# Patient Record
Sex: FEMALE | URBAN - METROPOLITAN AREA
[De-identification: names, ages, dates, MRNs, and addresses within clinical notes are randomized per-mention and may not be internally consistent; named-entity substitution may affect disease eponyms.]

---

## 2018-03-20 ENCOUNTER — HOSPITAL ENCOUNTER (OUTPATIENT)
Age: 73
Discharge: HOME | End: 2018-03-22
Payer: COMMERCIAL

## 2018-03-20 LAB
ALBUMIN SERPL-MCNC: 4.5 G/DL (ref 3.5–5.2)
ALP BLD-CCNC: 61 U/L (ref 35–104)
ALT SERPL-CCNC: 13 U/L (ref 0–32)
ANION GAP SERPL CALCULATED.3IONS-SCNC: 18 MMOL/L (ref 7–16)
AST SERPL-CCNC: 19 U/L (ref 0–31)
BASOPHILS ABSOLUTE: 0.09 E9/L (ref 0–0.2)
BASOPHILS RELATIVE PERCENT: 1.1 % (ref 0–2)
BILIRUB SERPL-MCNC: 0.5 MG/DL (ref 0–1.2)
BUN BLDV-MCNC: 21 MG/DL (ref 8–23)
CALCIUM SERPL-MCNC: 9.7 MG/DL (ref 8.6–10.2)
CHLORIDE BLD-SCNC: 103 MMOL/L (ref 98–107)
CHOLESTEROL, TOTAL: 193 MG/DL (ref 0–199)
CO2: 22 MMOL/L (ref 22–29)
CREAT SERPL-MCNC: 1.1 MG/DL (ref 0.5–1)
EOSINOPHILS ABSOLUTE: 0.18 E9/L (ref 0.05–0.5)
EOSINOPHILS RELATIVE PERCENT: 2.2 % (ref 0–6)
GFR AFRICAN AMERICAN: 59
GFR NON-AFRICAN AMERICAN: 49 ML/MIN/1.73
GLUCOSE BLD-MCNC: 109 MG/DL (ref 74–109)
HCT VFR BLD CALC: 46.1 % (ref 34–48)
HDLC SERPL-MCNC: 61 MG/DL
HEMOGLOBIN: 14.5 G/DL (ref 11.5–15.5)
IMMATURE GRANULOCYTES #: 0.04 E9/L
IMMATURE GRANULOCYTES %: 0.5 % (ref 0–5)
LDL CHOLESTEROL CALCULATED: 116 MG/DL (ref 0–99)
LYMPHOCYTES ABSOLUTE: 2.49 E9/L (ref 1.5–4)
LYMPHOCYTES RELATIVE PERCENT: 30.6 % (ref 20–42)
MCH RBC QN AUTO: 28.9 PG (ref 26–35)
MCHC RBC AUTO-ENTMCNC: 31.5 % (ref 32–34.5)
MCV RBC AUTO: 92 FL (ref 80–99.9)
MONOCYTES ABSOLUTE: 0.59 E9/L (ref 0.1–0.95)
MONOCYTES RELATIVE PERCENT: 7.2 % (ref 2–12)
NEUTROPHILS ABSOLUTE: 4.75 E9/L (ref 1.8–7.3)
NEUTROPHILS RELATIVE PERCENT: 58.4 % (ref 43–80)
PDW BLD-RTO: 13.7 FL (ref 11.5–15)
PLATELET # BLD: 237 E9/L (ref 130–450)
PMV BLD AUTO: 10.8 FL (ref 7–12)
POTASSIUM SERPL-SCNC: 4.3 MMOL/L (ref 3.5–5)
RBC # BLD: 5.01 E12/L (ref 3.5–5.5)
SODIUM BLD-SCNC: 143 MMOL/L (ref 132–146)
TOTAL PROTEIN: 7.6 G/DL (ref 6.4–8.3)
TRIGL SERPL-MCNC: 81 MG/DL (ref 0–149)
VLDLC SERPL CALC-MCNC: 16 MG/DL
WBC # BLD: 8.1 E9/L (ref 4.5–11.5)

## 2018-03-20 PROCEDURE — 80053 COMPREHEN METABOLIC PANEL: CPT

## 2018-03-20 PROCEDURE — 85025 COMPLETE CBC W/AUTO DIFF WBC: CPT

## 2018-03-20 PROCEDURE — 80061 LIPID PANEL: CPT

## 2018-07-31 ENCOUNTER — HOSPITAL ENCOUNTER (OUTPATIENT)
Age: 73
Discharge: HOME | End: 2018-08-02

## 2018-07-31 LAB
ABO/RH: NORMAL
ANION GAP SERPL CALCULATED.3IONS-SCNC: 15 MMOL/L (ref 7–16)
ANTIBODY SCREEN: NORMAL
BILIRUBIN URINE: NEGATIVE
BLOOD, URINE: NEGATIVE
BUN BLDV-MCNC: 29 MG/DL (ref 8–23)
CALCIUM SERPL-MCNC: 9.2 MG/DL (ref 8.6–10.2)
CHLORIDE BLD-SCNC: 101 MMOL/L (ref 98–107)
CLARITY: CLEAR
CO2: 26 MMOL/L (ref 22–29)
COLOR: YELLOW
CREAT SERPL-MCNC: 1.1 MG/DL (ref 0.5–1)
GFR AFRICAN AMERICAN: 59
GFR NON-AFRICAN AMERICAN: 49 ML/MIN/1.73
GLUCOSE BLD-MCNC: 71 MG/DL (ref 74–109)
GLUCOSE URINE: NEGATIVE MG/DL
HCT VFR BLD CALC: 43.9 % (ref 34–48)
HEMOGLOBIN: 14.5 G/DL (ref 11.5–15.5)
KETONES, URINE: NEGATIVE MG/DL
LEUKOCYTE ESTERASE, URINE: NEGATIVE
MCH RBC QN AUTO: 30 PG (ref 26–35)
MCHC RBC AUTO-ENTMCNC: 33 % (ref 32–34.5)
MCV RBC AUTO: 90.7 FL (ref 80–99.9)
NITRITE, URINE: NEGATIVE
PDW BLD-RTO: 13.6 FL (ref 11.5–15)
PH UA: 6 (ref 5–9)
PLATELET # BLD: 216 E9/L (ref 130–450)
PMV BLD AUTO: 10.9 FL (ref 7–12)
POTASSIUM SERPL-SCNC: 4.2 MMOL/L (ref 3.5–5)
PROTEIN UA: NEGATIVE MG/DL
RBC # BLD: 4.84 E12/L (ref 3.5–5.5)
SODIUM BLD-SCNC: 142 MMOL/L (ref 132–146)
SPECIFIC GRAVITY UA: 1.02 (ref 1–1.03)
UROBILINOGEN, URINE: 0.2 E.U./DL
WBC # BLD: 11.3 E9/L (ref 4.5–11.5)

## 2018-07-31 PROCEDURE — 87088 URINE BACTERIA CULTURE: CPT

## 2018-07-31 PROCEDURE — 85027 COMPLETE CBC AUTOMATED: CPT

## 2018-07-31 PROCEDURE — 86901 BLOOD TYPING SEROLOGIC RH(D): CPT

## 2018-07-31 PROCEDURE — 81003 URINALYSIS AUTO W/O SCOPE: CPT

## 2018-07-31 PROCEDURE — 87081 CULTURE SCREEN ONLY: CPT

## 2018-07-31 PROCEDURE — 86850 RBC ANTIBODY SCREEN: CPT

## 2018-07-31 PROCEDURE — 86900 BLOOD TYPING SEROLOGIC ABO: CPT

## 2018-07-31 PROCEDURE — 80048 BASIC METABOLIC PNL TOTAL CA: CPT

## 2018-08-02 LAB
MRSA CULTURE ONLY: NORMAL
URINE CULTURE, ROUTINE: NORMAL

## 2018-08-10 ENCOUNTER — HOSPITAL ENCOUNTER (OUTPATIENT)
Age: 73
Discharge: HOME | End: 2018-08-12

## 2018-08-10 LAB
ANION GAP SERPL CALCULATED.3IONS-SCNC: 10 MMOL/L (ref 7–16)
BUN BLDV-MCNC: 16 MG/DL (ref 8–23)
CALCIUM SERPL-MCNC: 8 MG/DL (ref 8.6–10.2)
CHLORIDE BLD-SCNC: 106 MMOL/L (ref 98–107)
CO2: 25 MMOL/L (ref 22–29)
CREAT SERPL-MCNC: 0.9 MG/DL (ref 0.5–1)
GFR AFRICAN AMERICAN: >60
GFR NON-AFRICAN AMERICAN: >60 ML/MIN/1.73
GLUCOSE BLD-MCNC: 141 MG/DL (ref 74–109)
HCT VFR BLD CALC: 33 % (ref 34–48)
HEMOGLOBIN: 10.8 G/DL (ref 11.5–15.5)
MCH RBC QN AUTO: 30.3 PG (ref 26–35)
MCHC RBC AUTO-ENTMCNC: 32.7 % (ref 32–34.5)
MCV RBC AUTO: 92.4 FL (ref 80–99.9)
PDW BLD-RTO: 13.2 FL (ref 11.5–15)
PLATELET # BLD: 166 E9/L (ref 130–450)
PMV BLD AUTO: 10.8 FL (ref 7–12)
POTASSIUM SERPL-SCNC: 4.1 MMOL/L (ref 3.5–5)
RBC # BLD: 3.57 E12/L (ref 3.5–5.5)
SODIUM BLD-SCNC: 141 MMOL/L (ref 132–146)
WBC # BLD: 11.5 E9/L (ref 4.5–11.5)

## 2018-08-10 PROCEDURE — 85027 COMPLETE CBC AUTOMATED: CPT

## 2018-08-10 PROCEDURE — 80048 BASIC METABOLIC PNL TOTAL CA: CPT

## 2018-08-11 ENCOUNTER — HOSPITAL ENCOUNTER (OUTPATIENT)
Age: 73
Discharge: HOME | End: 2018-08-13

## 2018-08-11 LAB
ANION GAP SERPL CALCULATED.3IONS-SCNC: 11 MMOL/L (ref 7–16)
BUN BLDV-MCNC: 21 MG/DL (ref 8–23)
CALCIUM SERPL-MCNC: 8.9 MG/DL (ref 8.6–10.2)
CHLORIDE BLD-SCNC: 103 MMOL/L (ref 98–107)
CO2: 24 MMOL/L (ref 22–29)
CREAT SERPL-MCNC: 1 MG/DL (ref 0.5–1)
GFR AFRICAN AMERICAN: >60
GFR NON-AFRICAN AMERICAN: 54 ML/MIN/1.73
GLUCOSE BLD-MCNC: 118 MG/DL (ref 74–109)
HCT VFR BLD CALC: 34.2 % (ref 34–48)
HEMOGLOBIN: 11.3 G/DL (ref 11.5–15.5)
MCH RBC QN AUTO: 29.7 PG (ref 26–35)
MCHC RBC AUTO-ENTMCNC: 33 % (ref 32–34.5)
MCV RBC AUTO: 90 FL (ref 80–99.9)
PDW BLD-RTO: 13.8 FL (ref 11.5–15)
PLATELET # BLD: 184 E9/L (ref 130–450)
PMV BLD AUTO: 11.3 FL (ref 7–12)
POTASSIUM SERPL-SCNC: 3.9 MMOL/L (ref 3.5–5)
RBC # BLD: 3.8 E12/L (ref 3.5–5.5)
SODIUM BLD-SCNC: 138 MMOL/L (ref 132–146)
WBC # BLD: 12.2 E9/L (ref 4.5–11.5)

## 2018-08-11 PROCEDURE — 80048 BASIC METABOLIC PNL TOTAL CA: CPT

## 2018-08-11 PROCEDURE — 85027 COMPLETE CBC AUTOMATED: CPT

## 2019-02-13 ENCOUNTER — HOSPITAL ENCOUNTER (OUTPATIENT)
Age: 74
Discharge: HOME | End: 2019-02-15
Payer: COMMERCIAL

## 2019-02-13 LAB
ALBUMIN SERPL-MCNC: 4.8 G/DL (ref 3.5–5.2)
ALP BLD-CCNC: 63 U/L (ref 35–104)
ALT SERPL-CCNC: 17 U/L (ref 0–32)
ANION GAP SERPL CALCULATED.3IONS-SCNC: 19 MMOL/L (ref 7–16)
AST SERPL-CCNC: 24 U/L (ref 0–31)
BASOPHILS ABSOLUTE: 0.12 E9/L (ref 0–0.2)
BASOPHILS RELATIVE PERCENT: 1.5 % (ref 0–2)
BILIRUB SERPL-MCNC: 0.6 MG/DL (ref 0–1.2)
BUN BLDV-MCNC: 25 MG/DL (ref 8–23)
CALCIUM SERPL-MCNC: 10.1 MG/DL (ref 8.6–10.2)
CHLORIDE BLD-SCNC: 103 MMOL/L (ref 98–107)
CHOLESTEROL, TOTAL: 195 MG/DL (ref 0–199)
CO2: 23 MMOL/L (ref 22–29)
CREAT SERPL-MCNC: 1.2 MG/DL (ref 0.5–1)
EOSINOPHILS ABSOLUTE: 0.16 E9/L (ref 0.05–0.5)
EOSINOPHILS RELATIVE PERCENT: 2 % (ref 0–6)
GFR AFRICAN AMERICAN: 53
GFR NON-AFRICAN AMERICAN: 44 ML/MIN/1.73
GLUCOSE BLD-MCNC: 88 MG/DL (ref 74–99)
HCT VFR BLD CALC: 46.1 % (ref 34–48)
HDLC SERPL-MCNC: 59 MG/DL
HEMOGLOBIN: 15.5 G/DL (ref 11.5–15.5)
IMMATURE GRANULOCYTES #: 0.04 E9/L
IMMATURE GRANULOCYTES %: 0.5 % (ref 0–5)
LDL CHOLESTEROL CALCULATED: 111 MG/DL (ref 0–99)
LYMPHOCYTES ABSOLUTE: 2.63 E9/L (ref 1.5–4)
LYMPHOCYTES RELATIVE PERCENT: 33.1 % (ref 20–42)
MCH RBC QN AUTO: 29.9 PG (ref 26–35)
MCHC RBC AUTO-ENTMCNC: 33.6 % (ref 32–34.5)
MCV RBC AUTO: 89 FL (ref 80–99.9)
MONOCYTES ABSOLUTE: 0.72 E9/L (ref 0.1–0.95)
MONOCYTES RELATIVE PERCENT: 9.1 % (ref 2–12)
NEUTROPHILS ABSOLUTE: 4.28 E9/L (ref 1.8–7.3)
NEUTROPHILS RELATIVE PERCENT: 53.8 % (ref 43–80)
PDW BLD-RTO: 13 FL (ref 11.5–15)
PLATELET # BLD: 252 E9/L (ref 130–450)
PMV BLD AUTO: 10.5 FL (ref 7–12)
POTASSIUM SERPL-SCNC: 4.1 MMOL/L (ref 3.5–5)
RBC # BLD: 5.18 E12/L (ref 3.5–5.5)
SODIUM BLD-SCNC: 145 MMOL/L (ref 132–146)
TOTAL PROTEIN: 7.8 G/DL (ref 6.4–8.3)
TRIGL SERPL-MCNC: 123 MG/DL (ref 0–149)
TSH SERPL DL<=0.05 MIU/L-ACNC: 3.29 UIU/ML (ref 0.27–4.2)
VLDLC SERPL CALC-MCNC: 25 MG/DL
WBC # BLD: 8 E9/L (ref 4.5–11.5)

## 2019-02-13 PROCEDURE — 84443 ASSAY THYROID STIM HORMONE: CPT

## 2019-02-13 PROCEDURE — 80061 LIPID PANEL: CPT

## 2019-02-13 PROCEDURE — 80053 COMPREHEN METABOLIC PANEL: CPT

## 2019-02-13 PROCEDURE — 85025 COMPLETE CBC W/AUTO DIFF WBC: CPT

## 2019-09-04 ENCOUNTER — HOSPITAL ENCOUNTER (OUTPATIENT)
Age: 74
Discharge: HOME | End: 2019-09-06
Payer: COMMERCIAL

## 2019-09-04 LAB
ALBUMIN SERPL-MCNC: 4.5 G/DL (ref 3.5–5.2)
ALP BLD-CCNC: 59 U/L (ref 35–104)
ALT SERPL-CCNC: 9 U/L (ref 0–32)
ANION GAP SERPL CALCULATED.3IONS-SCNC: 22 MMOL/L (ref 7–16)
AST SERPL-CCNC: 17 U/L (ref 0–31)
BASOPHILS ABSOLUTE: 0.08 E9/L (ref 0–0.2)
BASOPHILS RELATIVE PERCENT: 1.1 % (ref 0–2)
BILIRUB SERPL-MCNC: 0.7 MG/DL (ref 0–1.2)
BUN BLDV-MCNC: 28 MG/DL (ref 8–23)
CALCIUM SERPL-MCNC: 9.6 MG/DL (ref 8.6–10.2)
CHLORIDE BLD-SCNC: 107 MMOL/L (ref 98–107)
CHOLESTEROL, TOTAL: 220 MG/DL (ref 0–199)
CO2: 20 MMOL/L (ref 22–29)
CREAT SERPL-MCNC: 1.4 MG/DL (ref 0.5–1)
EOSINOPHILS ABSOLUTE: 0.17 E9/L (ref 0.05–0.5)
EOSINOPHILS RELATIVE PERCENT: 2.3 % (ref 0–6)
GFR AFRICAN AMERICAN: 44
GFR NON-AFRICAN AMERICAN: 37 ML/MIN/1.73
GLUCOSE BLD-MCNC: 97 MG/DL (ref 74–99)
HCT VFR BLD CALC: 44.3 % (ref 34–48)
HDLC SERPL-MCNC: 54 MG/DL
HEMOGLOBIN: 14.2 G/DL (ref 11.5–15.5)
IMMATURE GRANULOCYTES #: 0.03 E9/L
IMMATURE GRANULOCYTES %: 0.4 % (ref 0–5)
LDL CHOLESTEROL CALCULATED: 138 MG/DL (ref 0–99)
LYMPHOCYTES ABSOLUTE: 2.63 E9/L (ref 1.5–4)
LYMPHOCYTES RELATIVE PERCENT: 35.5 % (ref 20–42)
MCH RBC QN AUTO: 29.5 PG (ref 26–35)
MCHC RBC AUTO-ENTMCNC: 32.1 % (ref 32–34.5)
MCV RBC AUTO: 91.9 FL (ref 80–99.9)
MONOCYTES ABSOLUTE: 0.61 E9/L (ref 0.1–0.95)
MONOCYTES RELATIVE PERCENT: 8.2 % (ref 2–12)
NEUTROPHILS ABSOLUTE: 3.88 E9/L (ref 1.8–7.3)
NEUTROPHILS RELATIVE PERCENT: 52.5 % (ref 43–80)
PDW BLD-RTO: 12.9 FL (ref 11.5–15)
PLATELET # BLD: 206 E9/L (ref 130–450)
PMV BLD AUTO: 11 FL (ref 7–12)
POTASSIUM SERPL-SCNC: 3.8 MMOL/L (ref 3.5–5)
RBC # BLD: 4.82 E12/L (ref 3.5–5.5)
SODIUM BLD-SCNC: 149 MMOL/L (ref 132–146)
TOTAL PROTEIN: 7.3 G/DL (ref 6.4–8.3)
TRIGL SERPL-MCNC: 141 MG/DL (ref 0–149)
TSH SERPL DL<=0.05 MIU/L-ACNC: 2.7 UIU/ML (ref 0.27–4.2)
VLDLC SERPL CALC-MCNC: 28 MG/DL
WBC # BLD: 7.4 E9/L (ref 4.5–11.5)

## 2019-09-04 PROCEDURE — 84443 ASSAY THYROID STIM HORMONE: CPT

## 2019-09-04 PROCEDURE — 80053 COMPREHEN METABOLIC PANEL: CPT

## 2019-09-04 PROCEDURE — 85025 COMPLETE CBC W/AUTO DIFF WBC: CPT

## 2019-09-04 PROCEDURE — 80061 LIPID PANEL: CPT

## 2019-11-07 ENCOUNTER — HOSPITAL ENCOUNTER (OUTPATIENT)
Age: 74
Discharge: HOME | End: 2019-11-09

## 2019-11-07 LAB
ABO/RH: NORMAL
ANTIBODY SCREEN: NORMAL
BACTERIA: ABNORMAL /HPF
BILIRUBIN URINE: NEGATIVE
BLOOD, URINE: NEGATIVE
CLARITY: ABNORMAL
COLOR: YELLOW
GLUCOSE URINE: NEGATIVE MG/DL
KETONES, URINE: NEGATIVE MG/DL
LEUKOCYTE ESTERASE, URINE: ABNORMAL
NITRITE, URINE: POSITIVE
PH UA: 5.5 (ref 5–9)
PROTEIN UA: NEGATIVE MG/DL
RBC UA: ABNORMAL /HPF (ref 0–2)
SPECIFIC GRAVITY UA: 1.02 (ref 1–1.03)
UROBILINOGEN, URINE: 0.2 E.U./DL
WBC UA: ABNORMAL /HPF (ref 0–5)

## 2019-11-07 PROCEDURE — 87077 CULTURE AEROBIC IDENTIFY: CPT

## 2019-11-07 PROCEDURE — 86850 RBC ANTIBODY SCREEN: CPT

## 2019-11-07 PROCEDURE — 81001 URINALYSIS AUTO W/SCOPE: CPT

## 2019-11-07 PROCEDURE — 87081 CULTURE SCREEN ONLY: CPT

## 2019-11-07 PROCEDURE — 86901 BLOOD TYPING SEROLOGIC RH(D): CPT

## 2019-11-07 PROCEDURE — 87186 SC STD MICRODIL/AGAR DIL: CPT

## 2019-11-07 PROCEDURE — 86900 BLOOD TYPING SEROLOGIC ABO: CPT

## 2019-11-07 PROCEDURE — 87088 URINE BACTERIA CULTURE: CPT

## 2019-11-09 LAB
MRSA CULTURE ONLY: NORMAL
ORGANISM: ABNORMAL
URINE CULTURE, ROUTINE: ABNORMAL

## 2019-11-15 ENCOUNTER — HOSPITAL ENCOUNTER (OUTPATIENT)
Age: 74
Discharge: HOME | End: 2019-11-17

## 2019-11-15 LAB
ANION GAP SERPL CALCULATED.3IONS-SCNC: 15 MMOL/L (ref 7–16)
BUN BLDV-MCNC: 24 MG/DL (ref 8–23)
CALCIUM SERPL-MCNC: 8.7 MG/DL (ref 8.6–10.2)
CHLORIDE BLD-SCNC: 102 MMOL/L (ref 98–107)
CO2: 24 MMOL/L (ref 22–29)
CREAT SERPL-MCNC: 1.2 MG/DL (ref 0.5–1)
GFR AFRICAN AMERICAN: 53
GFR NON-AFRICAN AMERICAN: 44 ML/MIN/1.73
GLUCOSE BLD-MCNC: 123 MG/DL (ref 74–99)
HCT VFR BLD CALC: 34.4 % (ref 34–48)
HEMOGLOBIN: 11.2 G/DL (ref 11.5–15.5)
MCH RBC QN AUTO: 30.5 PG (ref 26–35)
MCHC RBC AUTO-ENTMCNC: 32.6 % (ref 32–34.5)
MCV RBC AUTO: 93.7 FL (ref 80–99.9)
PDW BLD-RTO: 13.1 FL (ref 11.5–15)
PLATELET # BLD: 182 E9/L (ref 130–450)
PMV BLD AUTO: 11 FL (ref 7–12)
POTASSIUM SERPL-SCNC: 4.4 MMOL/L (ref 3.5–5)
RBC # BLD: 3.67 E12/L (ref 3.5–5.5)
SODIUM BLD-SCNC: 141 MMOL/L (ref 132–146)
WBC # BLD: 13.6 E9/L (ref 4.5–11.5)

## 2019-11-15 PROCEDURE — 85027 COMPLETE CBC AUTOMATED: CPT

## 2019-11-15 PROCEDURE — 80048 BASIC METABOLIC PNL TOTAL CA: CPT

## 2019-11-16 ENCOUNTER — HOSPITAL ENCOUNTER (OUTPATIENT)
Age: 74
Discharge: HOME | End: 2019-11-18

## 2019-11-16 LAB
ANION GAP SERPL CALCULATED.3IONS-SCNC: 15 MMOL/L (ref 7–16)
BUN BLDV-MCNC: 17 MG/DL (ref 8–23)
CALCIUM SERPL-MCNC: 8.8 MG/DL (ref 8.6–10.2)
CHLORIDE BLD-SCNC: 94 MMOL/L (ref 98–107)
CO2: 25 MMOL/L (ref 22–29)
CREAT SERPL-MCNC: 1.2 MG/DL (ref 0.5–1)
GFR AFRICAN AMERICAN: 53
GFR NON-AFRICAN AMERICAN: 44 ML/MIN/1.73
GLUCOSE BLD-MCNC: 134 MG/DL (ref 74–99)
HCT VFR BLD CALC: 32.9 % (ref 34–48)
HEMOGLOBIN: 10.7 G/DL (ref 11.5–15.5)
MCH RBC QN AUTO: 30.4 PG (ref 26–35)
MCHC RBC AUTO-ENTMCNC: 32.5 % (ref 32–34.5)
MCV RBC AUTO: 93.5 FL (ref 80–99.9)
PDW BLD-RTO: 13.4 FL (ref 11.5–15)
PLATELET # BLD: 158 E9/L (ref 130–450)
PMV BLD AUTO: 10.9 FL (ref 7–12)
POTASSIUM SERPL-SCNC: 4.6 MMOL/L (ref 3.5–5)
RBC # BLD: 3.52 E12/L (ref 3.5–5.5)
SODIUM BLD-SCNC: 134 MMOL/L (ref 132–146)
WBC # BLD: 12.1 E9/L (ref 4.5–11.5)

## 2019-11-16 PROCEDURE — 80048 BASIC METABOLIC PNL TOTAL CA: CPT

## 2019-11-16 PROCEDURE — 85027 COMPLETE CBC AUTOMATED: CPT

## 2020-05-21 ENCOUNTER — HOSPITAL ENCOUNTER (OUTPATIENT)
Age: 75
Discharge: HOME | End: 2020-05-23
Payer: COMMERCIAL

## 2020-05-21 LAB
ALBUMIN SERPL-MCNC: 4.7 G/DL (ref 3.5–5.2)
ALP BLD-CCNC: 56 U/L (ref 35–104)
ALT SERPL-CCNC: 10 U/L (ref 0–32)
ANION GAP SERPL CALCULATED.3IONS-SCNC: 19 MMOL/L (ref 7–16)
AST SERPL-CCNC: 15 U/L (ref 0–31)
BASOPHILS ABSOLUTE: 0.1 E9/L (ref 0–0.2)
BASOPHILS RELATIVE PERCENT: 1 % (ref 0–2)
BILIRUB SERPL-MCNC: 0.9 MG/DL (ref 0–1.2)
BUN BLDV-MCNC: 32 MG/DL (ref 8–23)
CALCIUM SERPL-MCNC: 9.9 MG/DL (ref 8.6–10.2)
CHLORIDE BLD-SCNC: 100 MMOL/L (ref 98–107)
CO2: 22 MMOL/L (ref 22–29)
CREAT SERPL-MCNC: 1.4 MG/DL (ref 0.5–1)
EOSINOPHILS ABSOLUTE: 0.12 E9/L (ref 0.05–0.5)
EOSINOPHILS RELATIVE PERCENT: 1.2 % (ref 0–6)
GFR AFRICAN AMERICAN: 44
GFR NON-AFRICAN AMERICAN: 37 ML/MIN/1.73
GLUCOSE BLD-MCNC: 81 MG/DL (ref 74–99)
HCT VFR BLD CALC: 43.4 % (ref 34–48)
HEMOGLOBIN: 13.9 G/DL (ref 11.5–15.5)
IMMATURE GRANULOCYTES #: 0.16 E9/L
IMMATURE GRANULOCYTES %: 1.6 % (ref 0–5)
LYMPHOCYTES ABSOLUTE: 2.52 E9/L (ref 1.5–4)
LYMPHOCYTES RELATIVE PERCENT: 24.9 % (ref 20–42)
MCH RBC QN AUTO: 31.3 PG (ref 26–35)
MCHC RBC AUTO-ENTMCNC: 32 % (ref 32–34.5)
MCV RBC AUTO: 97.7 FL (ref 80–99.9)
MONOCYTES ABSOLUTE: 0.95 E9/L (ref 0.1–0.95)
MONOCYTES RELATIVE PERCENT: 9.4 % (ref 2–12)
NEUTROPHILS ABSOLUTE: 6.26 E9/L (ref 1.8–7.3)
NEUTROPHILS RELATIVE PERCENT: 61.9 % (ref 43–80)
PARATHYROID HORMONE INTACT: 50 PG/ML (ref 15–65)
PDW BLD-RTO: 13.5 FL (ref 11.5–15)
PLATELET # BLD: 240 E9/L (ref 130–450)
PMV BLD AUTO: 11 FL (ref 7–12)
POTASSIUM SERPL-SCNC: 3.8 MMOL/L (ref 3.5–5)
RBC # BLD: 4.44 E12/L (ref 3.5–5.5)
SODIUM BLD-SCNC: 141 MMOL/L (ref 132–146)
T4 FREE: 1.17 NG/DL (ref 0.93–1.7)
TOTAL PROTEIN: 7.6 G/DL (ref 6.4–8.3)
TSH SERPL DL<=0.05 MIU/L-ACNC: 2.64 UIU/ML (ref 0.27–4.2)
WBC # BLD: 10.1 E9/L (ref 4.5–11.5)

## 2020-05-21 PROCEDURE — 84439 ASSAY OF FREE THYROXINE: CPT

## 2020-05-21 PROCEDURE — 83970 ASSAY OF PARATHORMONE: CPT

## 2020-05-21 PROCEDURE — 84443 ASSAY THYROID STIM HORMONE: CPT

## 2020-05-21 PROCEDURE — 85025 COMPLETE CBC W/AUTO DIFF WBC: CPT

## 2020-05-21 PROCEDURE — 80053 COMPREHEN METABOLIC PANEL: CPT

## 2020-08-21 ENCOUNTER — HOSPITAL ENCOUNTER (OUTPATIENT)
Age: 75
Discharge: HOME | End: 2020-08-23
Payer: COMMERCIAL

## 2020-08-21 LAB
ALBUMIN SERPL-MCNC: 4.3 G/DL (ref 3.5–5.2)
ALP BLD-CCNC: 58 U/L (ref 35–104)
ALT SERPL-CCNC: 14 U/L (ref 0–32)
ANION GAP SERPL CALCULATED.3IONS-SCNC: 16 MMOL/L (ref 7–16)
AST SERPL-CCNC: 17 U/L (ref 0–31)
BASOPHILS ABSOLUTE: 0.08 E9/L (ref 0–0.2)
BASOPHILS RELATIVE PERCENT: 1 % (ref 0–2)
BILIRUB SERPL-MCNC: 0.8 MG/DL (ref 0–1.2)
BUN BLDV-MCNC: 19 MG/DL (ref 8–23)
CALCIUM SERPL-MCNC: 9.4 MG/DL (ref 8.6–10.2)
CHLORIDE BLD-SCNC: 104 MMOL/L (ref 98–107)
CO2: 22 MMOL/L (ref 22–29)
CREAT SERPL-MCNC: 1.2 MG/DL (ref 0.5–1)
EOSINOPHILS ABSOLUTE: 0.11 E9/L (ref 0.05–0.5)
EOSINOPHILS RELATIVE PERCENT: 1.4 % (ref 0–6)
GFR AFRICAN AMERICAN: 53
GFR NON-AFRICAN AMERICAN: 44 ML/MIN/1.73
GLUCOSE BLD-MCNC: 105 MG/DL (ref 74–99)
HCT VFR BLD CALC: 44.5 % (ref 34–48)
HEMOGLOBIN: 14.3 G/DL (ref 11.5–15.5)
IMMATURE GRANULOCYTES #: 0.1 E9/L
IMMATURE GRANULOCYTES %: 1.3 % (ref 0–5)
LYMPHOCYTES ABSOLUTE: 1.48 E9/L (ref 1.5–4)
LYMPHOCYTES RELATIVE PERCENT: 19.3 % (ref 20–42)
MCH RBC QN AUTO: 31.2 PG (ref 26–35)
MCHC RBC AUTO-ENTMCNC: 32.1 % (ref 32–34.5)
MCV RBC AUTO: 97.2 FL (ref 80–99.9)
MONOCYTES ABSOLUTE: 0.79 E9/L (ref 0.1–0.95)
MONOCYTES RELATIVE PERCENT: 10.3 % (ref 2–12)
NEUTROPHILS ABSOLUTE: 5.11 E9/L (ref 1.8–7.3)
NEUTROPHILS RELATIVE PERCENT: 66.7 % (ref 43–80)
PDW BLD-RTO: 13.9 FL (ref 11.5–15)
PLATELET # BLD: 188 E9/L (ref 130–450)
PMV BLD AUTO: 10.5 FL (ref 7–12)
POTASSIUM SERPL-SCNC: 3.4 MMOL/L (ref 3.5–5)
RBC # BLD: 4.58 E12/L (ref 3.5–5.5)
SODIUM BLD-SCNC: 142 MMOL/L (ref 132–146)
TOTAL PROTEIN: 6.9 G/DL (ref 6.4–8.3)
WBC # BLD: 7.7 E9/L (ref 4.5–11.5)

## 2020-08-21 PROCEDURE — 85025 COMPLETE CBC W/AUTO DIFF WBC: CPT

## 2020-08-21 PROCEDURE — 80053 COMPREHEN METABOLIC PANEL: CPT

## 2020-10-07 ENCOUNTER — HOSPITAL ENCOUNTER (OUTPATIENT)
Age: 75
Discharge: HOME | End: 2020-10-09

## 2020-10-07 LAB
ABO/RH: NORMAL
ANION GAP SERPL CALCULATED.3IONS-SCNC: 15 MMOL/L (ref 7–16)
ANTIBODY SCREEN: NORMAL
BUN BLDV-MCNC: 17 MG/DL (ref 8–23)
CALCIUM SERPL-MCNC: 9.6 MG/DL (ref 8.6–10.2)
CHLORIDE BLD-SCNC: 105 MMOL/L (ref 98–107)
CO2: 23 MMOL/L (ref 22–29)
CREAT SERPL-MCNC: 1.2 MG/DL (ref 0.5–1)
GFR AFRICAN AMERICAN: 53
GFR NON-AFRICAN AMERICAN: 44 ML/MIN/1.73
GLUCOSE BLD-MCNC: 105 MG/DL (ref 74–99)
HCT VFR BLD CALC: 41.2 % (ref 34–48)
HEMOGLOBIN: 14 G/DL (ref 11.5–15.5)
MCH RBC QN AUTO: 31.7 PG (ref 26–35)
MCHC RBC AUTO-ENTMCNC: 34 % (ref 32–34.5)
MCV RBC AUTO: 93.2 FL (ref 80–99.9)
PDW BLD-RTO: 13.2 FL (ref 11.5–15)
PLATELET # BLD: 219 E9/L (ref 130–450)
PMV BLD AUTO: 10.5 FL (ref 7–12)
POTASSIUM SERPL-SCNC: 3.5 MMOL/L (ref 3.5–5)
RBC # BLD: 4.42 E12/L (ref 3.5–5.5)
SODIUM BLD-SCNC: 143 MMOL/L (ref 132–146)
WBC # BLD: 8.6 E9/L (ref 4.5–11.5)

## 2020-10-07 PROCEDURE — 86901 BLOOD TYPING SEROLOGIC RH(D): CPT

## 2020-10-07 PROCEDURE — 86850 RBC ANTIBODY SCREEN: CPT

## 2020-10-07 PROCEDURE — 87081 CULTURE SCREEN ONLY: CPT

## 2020-10-07 PROCEDURE — 80048 BASIC METABOLIC PNL TOTAL CA: CPT

## 2020-10-07 PROCEDURE — 85027 COMPLETE CBC AUTOMATED: CPT

## 2020-10-07 PROCEDURE — 86900 BLOOD TYPING SEROLOGIC ABO: CPT

## 2020-10-09 LAB — MRSA CULTURE ONLY: NORMAL

## 2020-10-17 ENCOUNTER — HOSPITAL ENCOUNTER (OUTPATIENT)
Age: 75
Discharge: HOME | End: 2020-10-19

## 2020-10-17 LAB
ANION GAP SERPL CALCULATED.3IONS-SCNC: 12 MMOL/L (ref 7–16)
BUN BLDV-MCNC: 13 MG/DL (ref 8–23)
CALCIUM SERPL-MCNC: 8.8 MG/DL (ref 8.6–10.2)
CHLORIDE BLD-SCNC: 106 MMOL/L (ref 98–107)
CO2: 23 MMOL/L (ref 22–29)
CREAT SERPL-MCNC: 1 MG/DL (ref 0.5–1)
GFR AFRICAN AMERICAN: >60
GFR NON-AFRICAN AMERICAN: 54 ML/MIN/1.73
GLUCOSE BLD-MCNC: 170 MG/DL (ref 74–99)
HCT VFR BLD CALC: 35.2 % (ref 34–48)
HEMOGLOBIN: 11.7 G/DL (ref 11.5–15.5)
MCH RBC QN AUTO: 31.3 PG (ref 26–35)
MCHC RBC AUTO-ENTMCNC: 33.2 % (ref 32–34.5)
MCV RBC AUTO: 94.1 FL (ref 80–99.9)
PDW BLD-RTO: 13.1 FL (ref 11.5–15)
PLATELET # BLD: 216 E9/L (ref 130–450)
PMV BLD AUTO: 10.9 FL (ref 7–12)
POTASSIUM SERPL-SCNC: 3.3 MMOL/L (ref 3.5–5)
RBC # BLD: 3.74 E12/L (ref 3.5–5.5)
SODIUM BLD-SCNC: 141 MMOL/L (ref 132–146)
WBC # BLD: 14.7 E9/L (ref 4.5–11.5)

## 2020-10-17 PROCEDURE — 80048 BASIC METABOLIC PNL TOTAL CA: CPT

## 2020-10-17 PROCEDURE — 85027 COMPLETE CBC AUTOMATED: CPT

## 2020-10-18 ENCOUNTER — HOSPITAL ENCOUNTER (OUTPATIENT)
Age: 75
Discharge: HOME | End: 2020-10-20
Payer: COMMERCIAL

## 2020-10-18 LAB
ANION GAP SERPL CALCULATED.3IONS-SCNC: 10 MMOL/L (ref 7–16)
BUN BLDV-MCNC: 18 MG/DL (ref 8–23)
CALCIUM SERPL-MCNC: 9.3 MG/DL (ref 8.6–10.2)
CHLORIDE BLD-SCNC: 105 MMOL/L (ref 98–107)
CO2: 27 MMOL/L (ref 22–29)
CREAT SERPL-MCNC: 1 MG/DL (ref 0.5–1)
GFR AFRICAN AMERICAN: >60
GFR NON-AFRICAN AMERICAN: 54 ML/MIN/1.73
GLUCOSE BLD-MCNC: 104 MG/DL (ref 74–99)
HCT VFR BLD CALC: 34.4 % (ref 34–48)
HEMOGLOBIN: 11.1 G/DL (ref 11.5–15.5)
MCH RBC QN AUTO: 31.4 PG (ref 26–35)
MCHC RBC AUTO-ENTMCNC: 32.3 % (ref 32–34.5)
MCV RBC AUTO: 97.2 FL (ref 80–99.9)
PDW BLD-RTO: 13.3 FL (ref 11.5–15)
PLATELET # BLD: 237 E9/L (ref 130–450)
PMV BLD AUTO: 10.7 FL (ref 7–12)
POTASSIUM SERPL-SCNC: 3.5 MMOL/L (ref 3.5–5)
RBC # BLD: 3.54 E12/L (ref 3.5–5.5)
SODIUM BLD-SCNC: 142 MMOL/L (ref 132–146)
WBC # BLD: 14.6 E9/L (ref 4.5–11.5)

## 2020-10-18 PROCEDURE — 85027 COMPLETE CBC AUTOMATED: CPT

## 2020-10-18 PROCEDURE — 80048 BASIC METABOLIC PNL TOTAL CA: CPT

## 2020-10-19 ENCOUNTER — HOSPITAL ENCOUNTER (OUTPATIENT)
Age: 75
Discharge: HOME | End: 2020-10-21

## 2020-10-19 LAB
ANION GAP SERPL CALCULATED.3IONS-SCNC: 7 MMOL/L (ref 7–16)
BUN BLDV-MCNC: 21 MG/DL (ref 8–23)
CALCIUM SERPL-MCNC: 8.5 MG/DL (ref 8.6–10.2)
CHLORIDE BLD-SCNC: 104 MMOL/L (ref 98–107)
CO2: 27 MMOL/L (ref 22–29)
CREAT SERPL-MCNC: 1 MG/DL (ref 0.5–1)
GFR AFRICAN AMERICAN: >60
GFR NON-AFRICAN AMERICAN: 54 ML/MIN/1.73
GLUCOSE BLD-MCNC: 120 MG/DL (ref 74–99)
HCT VFR BLD CALC: 29.8 % (ref 34–48)
HEMOGLOBIN: 9.6 G/DL (ref 11.5–15.5)
MCH RBC QN AUTO: 31.2 PG (ref 26–35)
MCHC RBC AUTO-ENTMCNC: 32.2 % (ref 32–34.5)
MCV RBC AUTO: 96.8 FL (ref 80–99.9)
PDW BLD-RTO: 13.5 FL (ref 11.5–15)
PLATELET # BLD: 185 E9/L (ref 130–450)
PMV BLD AUTO: 10.6 FL (ref 7–12)
POTASSIUM SERPL-SCNC: 3.3 MMOL/L (ref 3.5–5)
RBC # BLD: 3.08 E12/L (ref 3.5–5.5)
SODIUM BLD-SCNC: 138 MMOL/L (ref 132–146)
WBC # BLD: 9.6 E9/L (ref 4.5–11.5)

## 2020-10-19 PROCEDURE — 80048 BASIC METABOLIC PNL TOTAL CA: CPT

## 2020-10-19 PROCEDURE — 85027 COMPLETE CBC AUTOMATED: CPT

## 2021-05-26 LAB
ALBUMIN SERPL-MCNC: 4.1 G/DL (ref 3.5–5.2)
ALP BLD-CCNC: 69 U/L (ref 35–104)
ALT SERPL-CCNC: 8 U/L (ref 0–32)
ANION GAP SERPL CALCULATED.3IONS-SCNC: 15 MMOL/L (ref 7–16)
AST SERPL-CCNC: 13 U/L (ref 0–31)
BASOPHILS ABSOLUTE: 0.09 E9/L (ref 0–0.2)
BASOPHILS RELATIVE PERCENT: 1.1 % (ref 0–2)
BILIRUB SERPL-MCNC: 0.6 MG/DL (ref 0–1.2)
BUN BLDV-MCNC: 13 MG/DL (ref 6–23)
CALCIUM SERPL-MCNC: 9.2 MG/DL (ref 8.6–10.2)
CHLORIDE BLD-SCNC: 106 MMOL/L (ref 98–107)
CHOLESTEROL, TOTAL: 210 MG/DL (ref 0–199)
CO2: 23 MMOL/L (ref 22–29)
CREAT SERPL-MCNC: 1.2 MG/DL (ref 0.5–1)
EOSINOPHILS ABSOLUTE: 0.12 E9/L (ref 0.05–0.5)
EOSINOPHILS RELATIVE PERCENT: 1.4 % (ref 0–6)
GFR AFRICAN AMERICAN: 53
GFR NON-AFRICAN AMERICAN: 44 ML/MIN/1.73
GLUCOSE BLD-MCNC: 103 MG/DL (ref 74–99)
HBA1C MFR BLD: 6.2 % (ref 4–5.6)
HCT VFR BLD CALC: 40.5 % (ref 34–48)
HDLC SERPL-MCNC: 48 MG/DL
HEMOGLOBIN: 13.3 G/DL (ref 11.5–15.5)
IMMATURE GRANULOCYTES #: 0.04 E9/L
IMMATURE GRANULOCYTES %: 0.5 % (ref 0–5)
LDL CHOLESTEROL CALCULATED: 131 MG/DL (ref 0–99)
LYMPHOCYTES ABSOLUTE: 1.78 E9/L (ref 1.5–4)
LYMPHOCYTES RELATIVE PERCENT: 21 % (ref 20–42)
MCH RBC QN AUTO: 30.4 PG (ref 26–35)
MCHC RBC AUTO-ENTMCNC: 32.8 % (ref 32–34.5)
MCV RBC AUTO: 92.5 FL (ref 80–99.9)
MONOCYTES ABSOLUTE: 0.82 E9/L (ref 0.1–0.95)
MONOCYTES RELATIVE PERCENT: 9.7 % (ref 2–12)
NEUTROPHILS ABSOLUTE: 5.62 E9/L (ref 1.8–7.3)
NEUTROPHILS RELATIVE PERCENT: 66.3 % (ref 43–80)
PDW BLD-RTO: 13.6 FL (ref 11.5–15)
PLATELET # BLD: 242 E9/L (ref 130–450)
PMV BLD AUTO: 10.6 FL (ref 7–12)
POTASSIUM SERPL-SCNC: 3.7 MMOL/L (ref 3.5–5)
RBC # BLD: 4.38 E12/L (ref 3.5–5.5)
SODIUM BLD-SCNC: 144 MMOL/L (ref 132–146)
TOTAL PROTEIN: 7.3 G/DL (ref 6.4–8.3)
TRIGL SERPL-MCNC: 154 MG/DL (ref 0–149)
TSH SERPL DL<=0.05 MIU/L-ACNC: 1.74 UIU/ML (ref 0.27–4.2)
VLDLC SERPL CALC-MCNC: 31 MG/DL
WBC # BLD: 8.5 E9/L (ref 4.5–11.5)

## 2023-04-26 LAB
CALCIUM (MG/DL) IN URINE: 9.5 MG/DL
CALCIUM (MG/L) IN 24 HOUR URINE: 150 MG/24H (ref 100–300)
COLLECTION DURATION OF URINE: 24 HR
CREATININE (MG/24HR) IN 24 HOUR URINE: 1.17 G/24H (ref 0.67–1.59)
CREATININE (MG/DL) IN URINE: 74.5 MG/DL (ref 20–320)
MAGNESIUM (MG/24HR) IN 24 HOUR URINE: 59.2 MG/24H (ref 10–240)
MAGNESIUM (MG/DL) IN URINE: 3.76 MG/DL
POTASSIUM (MMOL/24HR) IN 24 HOUR URINE: 44 MMOL/24H (ref 25–125)
POTASSIUM (MMOL/L) IN URINE: 28 MMOL/L
SODIUM (MMOL/24 HR) IN 24 HOUR URINE: 135 MMOL/24H (ref 80–220)
SODIUM (MMOL/L ) IN URINE: 86 MMOL/L
URATE (MG/DL) IN URINE: 41 MG/DL
URIC ACID 24 HOUR URINE: 646 MG/24H (ref 150–990)
VOLUME OF URINE: 1575 ML

## 2023-05-01 LAB
CITRATE URINE - PER VOLUME: 60 MG/L
CITRATE URINE-  PER 24H: 94 MG/D (ref 320–1240)
CITRATE URINE- RATIO TO CRT: 85 MG/G
CREATININE 24 HOUR URINE: 1118 MG/D (ref 500–1400)
CREATININE, URINE - PER VOLUME: 71 MG/DL
HOURS COLLECTED (ARUP): 24
TOTAL VOLUME (ARUP): 1575

## 2023-05-02 LAB
CREATININE 24 HOUR URINE: 1118 MG/D (ref 500–1400)
CREATININE, URINE - PER VOLUME: 71 MG/DL
HOURS COLLECTED (ARUP): 24
OXALATE URINE PER 24 HOURS: 20 MG/D (ref 13–40)
OXALATE URINE PER VOLUME: 13 MG/L
TOTAL VOLUME (ARUP): 1575

## 2024-01-09 ENCOUNTER — TELEPHONE (OUTPATIENT)
Dept: CARDIOLOGY | Facility: CLINIC | Age: 79
End: 2024-01-09
Payer: MEDICARE

## 2024-01-09 DIAGNOSIS — I10 HYPERTENSION, UNSPECIFIED TYPE: Primary | ICD-10-CM

## 2024-01-09 NOTE — TELEPHONE ENCOUNTER
1/11/24  1702  Called patient. Patient reports she does not need any medication renewals at this time.    Informed her that BMP ordered for her to have done before next appt, and if she is finding she needs renewals on medications to call office.    Patient verbalized understanding.    1/9/24  1154  Called patient to inform her to have labs done for medication renewal;' no answer and busy signal after the 4th ring. Will try later.

## 2024-01-23 ENCOUNTER — TRANSCRIBE ORDERS (OUTPATIENT)
Dept: ADMINISTRATIVE | Age: 79
End: 2024-01-23

## 2024-01-23 DIAGNOSIS — H53.34 SUPPRESSION OF BINOCULAR VISION: Primary | ICD-10-CM

## 2024-01-30 DIAGNOSIS — I10 HYPERTENSION, UNSPECIFIED TYPE: Primary | ICD-10-CM

## 2024-01-30 RX ORDER — LISINOPRIL 20 MG/1
20 TABLET ORAL DAILY
Qty: 90 TABLET | Refills: 0 | Status: SHIPPED | OUTPATIENT
Start: 2024-01-30 | End: 2024-04-08 | Stop reason: SDUPTHER

## 2024-01-30 RX ORDER — LISINOPRIL 20 MG/1
20 TABLET ORAL DAILY
COMMUNITY
Start: 2021-11-12 | End: 2024-01-30 | Stop reason: SDUPTHER

## 2024-01-31 ENCOUNTER — HOSPITAL ENCOUNTER (OUTPATIENT)
Age: 79
Discharge: HOME | End: 2024-01-31
Attending: INTERNAL MEDICINE
Payer: MEDICARE

## 2024-01-31 ENCOUNTER — HOSPITAL ENCOUNTER (OUTPATIENT)
Dept: MRI IMAGING | Age: 79
Discharge: HOME | End: 2024-02-02
Attending: INTERNAL MEDICINE
Payer: MEDICARE

## 2024-01-31 DIAGNOSIS — H53.34 SUPPRESSION OF BINOCULAR VISION: ICD-10-CM

## 2024-01-31 LAB
BUN SERPL-MCNC: 19 MG/DL (ref 6–23)
CREAT SERPL-MCNC: 1.5 MG/DL (ref 0.5–1)
GFR SERPL CREATININE-BSD FRML MDRD: 37 ML/MIN/1.73M2

## 2024-01-31 PROCEDURE — 6360000004 HC RX CONTRAST MEDICATION: Performed by: RADIOLOGY

## 2024-01-31 PROCEDURE — A9577 INJ MULTIHANCE: HCPCS | Performed by: RADIOLOGY

## 2024-01-31 PROCEDURE — 36415 COLL VENOUS BLD VENIPUNCTURE: CPT

## 2024-01-31 PROCEDURE — 84520 ASSAY OF UREA NITROGEN: CPT

## 2024-01-31 PROCEDURE — 82565 ASSAY OF CREATININE: CPT

## 2024-01-31 PROCEDURE — 70553 MRI BRAIN STEM W/O & W/DYE: CPT

## 2024-01-31 RX ADMIN — GADOBENATE DIMEGLUMINE 17 ML: 529 INJECTION, SOLUTION INTRAVENOUS at 11:57

## 2024-02-28 ENCOUNTER — APPOINTMENT (OUTPATIENT)
Dept: UROLOGY | Facility: CLINIC | Age: 79
End: 2024-02-28
Payer: MEDICARE

## 2024-03-19 PROBLEM — K21.9 GERD (GASTROESOPHAGEAL REFLUX DISEASE): Status: ACTIVE | Noted: 2024-03-19

## 2024-03-19 PROBLEM — R00.2 PALPITATIONS: Status: ACTIVE | Noted: 2024-03-19

## 2024-03-19 PROBLEM — A04.8 H. PYLORI INFECTION: Status: ACTIVE | Noted: 2024-03-19

## 2024-03-19 PROBLEM — E78.5 HYPERLIPIDEMIA: Status: ACTIVE | Noted: 2024-03-19

## 2024-03-19 PROBLEM — B37.9 CANDIDIASIS: Status: ACTIVE | Noted: 2024-03-19

## 2024-03-19 PROBLEM — I10 BENIGN ESSENTIAL HYPERTENSION: Status: ACTIVE | Noted: 2024-03-19

## 2024-03-19 PROBLEM — R07.9 CHEST PAIN: Status: ACTIVE | Noted: 2024-03-19

## 2024-03-19 PROBLEM — I25.10 MILD CAD: Status: ACTIVE | Noted: 2024-03-19

## 2024-03-19 PROBLEM — R06.02 SHORTNESS OF BREATH ON EXERTION: Status: ACTIVE | Noted: 2024-03-19

## 2024-03-19 RX ORDER — METOPROLOL SUCCINATE 50 MG/1
50 TABLET, EXTENDED RELEASE ORAL DAILY
COMMUNITY
Start: 2021-11-12

## 2024-03-19 RX ORDER — SIMVASTATIN 20 MG/1
20 TABLET, FILM COATED ORAL NIGHTLY
COMMUNITY
Start: 2021-11-12 | End: 2024-04-12 | Stop reason: WASHOUT

## 2024-03-19 RX ORDER — PANTOPRAZOLE SODIUM 40 MG/1
40 TABLET, DELAYED RELEASE ORAL
COMMUNITY

## 2024-03-19 RX ORDER — AMLODIPINE BESYLATE 5 MG/1
5 TABLET ORAL DAILY
COMMUNITY
Start: 2023-04-05

## 2024-03-19 RX ORDER — BRIMONIDINE TARTRATE AND TIMOLOL MALEATE 2; 5 MG/ML; MG/ML
SOLUTION OPHTHALMIC
COMMUNITY
Start: 2023-11-30

## 2024-03-19 RX ORDER — ATORVASTATIN CALCIUM 40 MG/1
40 TABLET, FILM COATED ORAL NIGHTLY
COMMUNITY
Start: 2023-12-26 | End: 2024-05-06 | Stop reason: SDUPTHER

## 2024-04-01 DIAGNOSIS — I10 HYPERTENSION, UNSPECIFIED TYPE: ICD-10-CM

## 2024-04-08 RX ORDER — LISINOPRIL 20 MG/1
20 TABLET ORAL DAILY
Qty: 90 TABLET | Refills: 3 | OUTPATIENT
Start: 2024-04-08

## 2024-04-11 NOTE — PROGRESS NOTES
"Counseling:  The patient was counseled regarding diagnostic results, instructions for management, risk factor reductions, prognosis, patient and family education, impressions, risks and benefits of treatment options and importance of compliance with treatment.      Chief Complaint:   The patient presents today for annual followup of CAD, HTN and dyslipidemia.     History Of Present Illness:    Lizbeth Fulton is a 78 year old female patient who presents today in the company of her  for annual followup of HTN, CAD and hyperlipidemia. Her PMH is significant for mild CAD, HTN, GERD, h/o H. pylori infection, and hyperlipidemia. Over the past year, the patient states that she has done well from a cardiac standpoint. She denies any CP, chest discomfort or SOB. She indicates that she recently had removal of a pituitary adenoma, which she is recovering well from with steady improvement in her vision. BP is elevated today, but the patient states that it is typically stable at home. EKG today shows NSR with no acute changes. The patient is compliant with her prescribed medications.     Last Recorded Vitals:  Vitals:    04/12/24 1105 04/12/24 1135   BP: 170/88 140/84   BP Location: Left arm    Pulse: 68    Weight: 83.9 kg (185 lb)    Height: 1.615 m (5' 3.58\")        Past Surgical History:  She has a past surgical history that includes Other surgical history (02/24/2022); Other surgical history (02/24/2022); Other surgical history (02/24/2022); and Other surgical history (02/24/2022).      Social History:  She reports that she has never smoked. She has never used smokeless tobacco. She reports current alcohol use. She reports that she does not use drugs.    Family History:  No family history on file.     Allergies:  Adhesive tape-silicones, Morphine, and Naproxen    Outpatient Medications:  Current Outpatient Medications   Medication Instructions    acetaminophen (TYLENOL) 500 mg, oral, Every 8 hours PRN    amLODIPine " (NORVASC) 5 mg, oral, Daily    aspirin 81 mg, oral, Daily RT    atorvastatin (LIPITOR) 40 mg, oral, Nightly    brimonidine-timoloL (Combigan) 0.2-0.5 % ophthalmic solution instill 1 drop into left eye twice a day    hydrocortisone (CORTEF) 10 mg, oral, Daily RT    levothyroxine (SYNTHROID, LEVOXYL) 50 mcg, oral, Daily RT    lisinopril 20 mg, oral, Daily    metoprolol succinate XL (TOPROL-XL) 50 mg, oral, Daily    pantoprazole (PROTONIX) 40 mg, oral, Daily RT     Review of Systems   All other systems reviewed and are negative.     Physical Exam:  Constitutional:       Appearance: Healthy appearance. Not in distress.   Neck:      Vascular: No JVR. JVD normal.   Pulmonary:      Effort: Pulmonary effort is normal.      Breath sounds: Normal breath sounds. No wheezing. No rhonchi. No rales.   Chest:      Chest wall: Not tender to palpatation.   Cardiovascular:      PMI at left midclavicular line. Normal rate. Regular rhythm. Normal S1. Normal S2.       Murmurs: There is no murmur.      No gallop.  No click. No rub.   Pulses:     Intact distal pulses.   Edema:     Peripheral edema absent.   Abdominal:      General: Bowel sounds are normal.      Palpations: Abdomen is soft.      Tenderness: There is no abdominal tenderness.   Musculoskeletal: Normal range of motion.         General: No tenderness. Skin:     General: Skin is warm and dry.   Neurological:      General: No focal deficit present.      Mental Status: Alert and oriented to person, place and time.          Last Labs:  CBC -  Lab Results   Component Value Date    WBC 7.1 04/19/2022    HGB 14.3 04/19/2022    HCT 43.1 04/19/2022    MCV 92 04/19/2022     04/19/2022       CMP -  Lab Results   Component Value Date    CALCIUM 9.2 04/19/2022       RENAL FUNCTION PANEL -   Lab Results   Component Value Date    GLUCOSE 99 04/19/2022     04/19/2022    K 3.7 04/19/2022     04/19/2022    CO2 27 04/19/2022    ANIONGAP 13 04/19/2022    BUN 21 04/19/2022     CREATININE 1.27 (H) 04/19/2022    CALCIUM 9.2 04/19/2022        Lab Results   Component Value Date    HGBA1C 6.2 (H) 05/26/2021       Last Cardiology Tests:  03/10/2022 - Vascular Lab Renal Artery U/S   1. Right Renal Artery: Right renal arteries demonstrate no evidence of hemodynamically significant stenosis. The right renal vein is patent with pulsatile flow.  2. Left Renal Artery: Left renal arteries demonstrate no evidence of hemodynamically significant stenosis. The left renal vein is patent with pulsatile flow. Single renal cyst documented in the left kidney.     03/10/2022 - TTE  1. The left ventricular systolic function is normal with a 60-65% estimated ejection fraction.  2. There is moderate tricuspid regurgitation.     02/08/2022 - Cardiac Catheterization (LH)  No evidence of significant coronary artery disease.     Lab review: I have personally reviewed the laboratory result(s).    Assessment/Plan   1) HTN / Leg Swelling  On lisinopril 20 mg daily, metoprolol succinate 50 mg daily, amlodipine 5 mg daily  Renal artery duplex March 2022 negative for SARA bilaterally  TTE March 2022 with normal LVEF  Continue home monitoring of BP   Elevated today, but stable at home per patient  F/U 1 year      2) Mild CAD   On ASA 81 mg daily, lisinopril 20 mg daily, metoprolol succinate 50 mg daily, amlodipine 5 mg daily, atorvastatin 40 mg daily.  Initially presented with chest pain suggestive of typical angina  [facility] Feb 2022 with mild CAD  Denies CP, chest discomfort or SOB  Check lipid panel  F/U 1 year      3) Hyperlipidemia  On atorvastatin 40 mg daily at bedtime   Simvastatin previously switched to atorvastatin s/t persistently elevated LDL  Goal LDL <70  Check lipid panel  F/U 1 year     4) Pituitary Adenoma   S/P recent 03/2024  Steadily recovering, with improvement in vision      Scribe Attestation  By signing my name below, I, Desire Andra Nova   attest that this documentation has been prepared under the  direction and in the presence of Isaías Mccray MD.

## 2024-04-12 ENCOUNTER — LAB (OUTPATIENT)
Dept: LAB | Facility: LAB | Age: 79
End: 2024-04-12
Payer: MEDICARE

## 2024-04-12 ENCOUNTER — OFFICE VISIT (OUTPATIENT)
Dept: CARDIOLOGY | Facility: CLINIC | Age: 79
End: 2024-04-12
Payer: MEDICARE

## 2024-04-12 VITALS
SYSTOLIC BLOOD PRESSURE: 140 MMHG | DIASTOLIC BLOOD PRESSURE: 84 MMHG | WEIGHT: 185 LBS | HEIGHT: 64 IN | HEART RATE: 68 BPM | BODY MASS INDEX: 31.58 KG/M2

## 2024-04-12 DIAGNOSIS — I10 HYPERTENSION, UNSPECIFIED TYPE: ICD-10-CM

## 2024-04-12 LAB
ANION GAP SERPL CALC-SCNC: 10 MMOL/L (ref 10–20)
BUN SERPL-MCNC: 20 MG/DL (ref 6–23)
CALCIUM SERPL-MCNC: 9.2 MG/DL (ref 8.6–10.3)
CHLORIDE SERPL-SCNC: 107 MMOL/L (ref 98–107)
CHOLEST SERPL-MCNC: 161 MG/DL (ref 0–199)
CHOLESTEROL/HDL RATIO: 3.1
CO2 SERPL-SCNC: 28 MMOL/L (ref 21–32)
CREAT SERPL-MCNC: 1.55 MG/DL (ref 0.5–1.05)
EGFRCR SERPLBLD CKD-EPI 2021: 34 ML/MIN/1.73M*2
GLUCOSE SERPL-MCNC: 88 MG/DL (ref 74–99)
HDLC SERPL-MCNC: 51.6 MG/DL
LDLC SERPL CALC-MCNC: 83 MG/DL
NON HDL CHOLESTEROL: 109 MG/DL (ref 0–149)
POTASSIUM SERPL-SCNC: 4.4 MMOL/L (ref 3.5–5.3)
SODIUM SERPL-SCNC: 141 MMOL/L (ref 136–145)
TRIGL SERPL-MCNC: 134 MG/DL (ref 0–149)
VLDL: 27 MG/DL (ref 0–40)

## 2024-04-12 PROCEDURE — 3079F DIAST BP 80-89 MM HG: CPT | Performed by: INTERNAL MEDICINE

## 2024-04-12 PROCEDURE — 80061 LIPID PANEL: CPT

## 2024-04-12 PROCEDURE — 1160F RVW MEDS BY RX/DR IN RCRD: CPT | Performed by: INTERNAL MEDICINE

## 2024-04-12 PROCEDURE — 93000 ELECTROCARDIOGRAM COMPLETE: CPT | Performed by: INTERNAL MEDICINE

## 2024-04-12 PROCEDURE — 1159F MED LIST DOCD IN RCRD: CPT | Performed by: INTERNAL MEDICINE

## 2024-04-12 PROCEDURE — 1036F TOBACCO NON-USER: CPT | Performed by: INTERNAL MEDICINE

## 2024-04-12 PROCEDURE — 36415 COLL VENOUS BLD VENIPUNCTURE: CPT

## 2024-04-12 PROCEDURE — 99213 OFFICE O/P EST LOW 20 MIN: CPT | Performed by: INTERNAL MEDICINE

## 2024-04-12 PROCEDURE — 3077F SYST BP >= 140 MM HG: CPT | Performed by: INTERNAL MEDICINE

## 2024-04-12 PROCEDURE — 80048 BASIC METABOLIC PNL TOTAL CA: CPT

## 2024-04-12 RX ORDER — CEPHALEXIN 500 MG/1
1 CAPSULE ORAL 2 TIMES DAILY
COMMUNITY
End: 2024-04-12 | Stop reason: WASHOUT

## 2024-04-12 RX ORDER — CLOTRIMAZOLE 10 MG/1
LOZENGE ORAL; TOPICAL
COMMUNITY
End: 2024-04-12 | Stop reason: WASHOUT

## 2024-04-12 RX ORDER — LISINOPRIL 20 MG/1
20 TABLET ORAL DAILY
Qty: 90 TABLET | Refills: 3 | Status: SHIPPED | OUTPATIENT
Start: 2024-04-12 | End: 2025-04-12

## 2024-04-12 RX ORDER — ISOSORBIDE MONONITRATE 30 MG/1
0.5 TABLET, EXTENDED RELEASE ORAL DAILY
COMMUNITY
End: 2024-04-12 | Stop reason: WASHOUT

## 2024-04-12 RX ORDER — DOXYCYCLINE 100 MG/1
CAPSULE ORAL
COMMUNITY
End: 2024-04-12 | Stop reason: WASHOUT

## 2024-04-12 RX ORDER — OXYCODONE AND ACETAMINOPHEN 10; 325 MG/1; MG/1
1 TABLET ORAL EVERY 4 HOURS PRN
COMMUNITY
End: 2024-04-12 | Stop reason: WASHOUT

## 2024-04-12 RX ORDER — AMOXICILLIN AND CLAVULANATE POTASSIUM 875; 125 MG/1; MG/1
TABLET, FILM COATED ORAL
COMMUNITY
End: 2024-04-12 | Stop reason: WASHOUT

## 2024-04-12 RX ORDER — KETOROLAC TROMETHAMINE 5 MG/ML
SOLUTION OPHTHALMIC
COMMUNITY
End: 2024-04-12 | Stop reason: WASHOUT

## 2024-04-12 RX ORDER — GABAPENTIN 100 MG/1
1 CAPSULE ORAL 3 TIMES DAILY
COMMUNITY
End: 2024-04-12 | Stop reason: WASHOUT

## 2024-04-12 RX ORDER — AMOXICILLIN 500 MG/1
CAPSULE ORAL
COMMUNITY
End: 2024-04-12 | Stop reason: WASHOUT

## 2024-04-12 RX ORDER — METRONIDAZOLE 250 MG/1
1 TABLET ORAL EVERY 6 HOURS
COMMUNITY
End: 2024-04-12 | Stop reason: WASHOUT

## 2024-04-12 RX ORDER — OFLOXACIN 3 MG/ML
SOLUTION/ DROPS OPHTHALMIC
COMMUNITY
End: 2024-04-12 | Stop reason: WASHOUT

## 2024-04-12 RX ORDER — LEVOTHYROXINE SODIUM 50 UG/1
50 TABLET ORAL
COMMUNITY
Start: 2024-02-29

## 2024-04-12 RX ORDER — ACETAMINOPHEN 500 MG
500 TABLET ORAL EVERY 8 HOURS PRN
COMMUNITY

## 2024-04-12 RX ORDER — FAMOTIDINE 40 MG/1
1 TABLET, FILM COATED ORAL NIGHTLY
COMMUNITY
End: 2024-04-12 | Stop reason: WASHOUT

## 2024-04-12 RX ORDER — HYDROCORTISONE 10 MG/1
10 TABLET ORAL
COMMUNITY
Start: 2024-02-29

## 2024-04-12 RX ORDER — ACYCLOVIR 800 MG/1
TABLET ORAL
COMMUNITY
End: 2024-04-12 | Stop reason: WASHOUT

## 2024-04-12 ASSESSMENT — ENCOUNTER SYMPTOMS
OCCASIONAL FEELINGS OF UNSTEADINESS: 1
DEPRESSION: 1
LOSS OF SENSATION IN FEET: 1

## 2024-04-12 NOTE — TELEPHONE ENCOUNTER
4/12/24  1604  Called results to patient with patient verbalizing understanding.    ----- Message from Isaías Mccray MD sent at 4/12/2024  2:25 PM EDT -----  Labs stable

## 2024-04-12 NOTE — PATIENT INSTRUCTIONS
Continue all current medications as prescribed.   Please have blood work drawn to check on your cholesterol (fasting). You will be notified of the results once they become available.  Followup with Dr. Mccray in 1 year, sooner should any issues or concerns arise before then.     If you have any questions or cardiac concerns, please call our office at [phone].

## 2024-05-06 DIAGNOSIS — E78.5 HYPERLIPIDEMIA, UNSPECIFIED HYPERLIPIDEMIA TYPE: Primary | ICD-10-CM

## 2024-05-06 RX ORDER — ATORVASTATIN CALCIUM 40 MG/1
40 TABLET, FILM COATED ORAL NIGHTLY
Qty: 90 TABLET | Refills: 0 | Status: SHIPPED | OUTPATIENT
Start: 2024-05-06

## 2024-07-22 DIAGNOSIS — E78.5 HYPERLIPIDEMIA, UNSPECIFIED HYPERLIPIDEMIA TYPE: ICD-10-CM

## 2024-08-02 DIAGNOSIS — E78.5 HYPERLIPIDEMIA, UNSPECIFIED HYPERLIPIDEMIA TYPE: ICD-10-CM

## 2024-08-02 RX ORDER — ATORVASTATIN CALCIUM 40 MG/1
40 TABLET, FILM COATED ORAL NIGHTLY
Qty: 90 TABLET | Refills: 2 | Status: SHIPPED | OUTPATIENT
Start: 2024-08-02

## 2024-08-07 RX ORDER — ATORVASTATIN CALCIUM 40 MG/1
40 TABLET, FILM COATED ORAL NIGHTLY
Qty: 90 TABLET | Refills: 0 | OUTPATIENT
Start: 2024-08-07

## 2024-12-19 DIAGNOSIS — E78.5 HYPERLIPIDEMIA, UNSPECIFIED HYPERLIPIDEMIA TYPE: ICD-10-CM

## 2024-12-19 RX ORDER — ATORVASTATIN CALCIUM 40 MG/1
40 TABLET, FILM COATED ORAL NIGHTLY
Qty: 100 TABLET | Refills: 2 | Status: SHIPPED | OUTPATIENT
Start: 2024-12-19

## 2025-03-19 DIAGNOSIS — I10 HYPERTENSION, UNSPECIFIED TYPE: ICD-10-CM

## 2025-03-23 RX ORDER — LISINOPRIL 20 MG/1
20 TABLET ORAL DAILY
Qty: 90 TABLET | Refills: 0 | Status: SHIPPED | OUTPATIENT
Start: 2025-03-23 | End: 2025-06-21

## 2025-04-08 ENCOUNTER — TELEPHONE (OUTPATIENT)
Dept: CARDIOLOGY | Facility: HOSPITAL | Age: 80
End: 2025-04-08
Payer: MEDICARE

## 2025-04-08 NOTE — TELEPHONE ENCOUNTER
Pt was present in office with her  for his appt with Dr. Mccray and req to schedule cardiac clearance appt for hip replacement surgery scheduled 5/27 with Dr. Krause. Pt agreeable to move her annual appt with Dr. Mccray to 5/1 and will have cardiac risk eval then.

## 2025-04-14 ENCOUNTER — APPOINTMENT (OUTPATIENT)
Dept: CARDIOLOGY | Facility: CLINIC | Age: 80
End: 2025-04-14
Payer: MEDICARE

## 2025-05-01 ENCOUNTER — APPOINTMENT (OUTPATIENT)
Dept: CARDIOLOGY | Facility: HOSPITAL | Age: 80
End: 2025-05-01
Payer: MEDICARE

## 2025-05-01 VITALS
DIASTOLIC BLOOD PRESSURE: 88 MMHG | BODY MASS INDEX: 33.13 KG/M2 | WEIGHT: 187 LBS | SYSTOLIC BLOOD PRESSURE: 122 MMHG | HEIGHT: 63 IN | HEART RATE: 80 BPM

## 2025-05-01 DIAGNOSIS — I10 BENIGN ESSENTIAL HYPERTENSION: ICD-10-CM

## 2025-05-01 DIAGNOSIS — Z01.810 PREOP CARDIOVASCULAR EXAM: ICD-10-CM

## 2025-05-01 DIAGNOSIS — I25.10 MILD CAD: Primary | ICD-10-CM

## 2025-05-01 DIAGNOSIS — I10 HYPERTENSION, UNSPECIFIED TYPE: ICD-10-CM

## 2025-05-01 LAB
ATRIAL RATE: 80 BPM
P AXIS: 72 DEGREES
P OFFSET: 204 MS
P ONSET: 145 MS
PR INTERVAL: 170 MS
Q ONSET: 230 MS
QRS COUNT: 13 BEATS
QRS DURATION: 78 MS
QT INTERVAL: 410 MS
QTC CALCULATION(BAZETT): 472 MS
QTC FREDERICIA: 451 MS
R AXIS: -39 DEGREES
T AXIS: 56 DEGREES
T OFFSET: 435 MS
VENTRICULAR RATE: 80 BPM

## 2025-05-01 PROCEDURE — 93010 ELECTROCARDIOGRAM REPORT: CPT | Performed by: INTERNAL MEDICINE

## 2025-05-01 PROCEDURE — 99213 OFFICE O/P EST LOW 20 MIN: CPT | Performed by: INTERNAL MEDICINE

## 2025-05-01 PROCEDURE — 3074F SYST BP LT 130 MM HG: CPT | Performed by: INTERNAL MEDICINE

## 2025-05-01 PROCEDURE — 1160F RVW MEDS BY RX/DR IN RCRD: CPT | Performed by: INTERNAL MEDICINE

## 2025-05-01 PROCEDURE — 1159F MED LIST DOCD IN RCRD: CPT | Performed by: INTERNAL MEDICINE

## 2025-05-01 PROCEDURE — 93005 ELECTROCARDIOGRAM TRACING: CPT | Performed by: INTERNAL MEDICINE

## 2025-05-01 PROCEDURE — 99213 OFFICE O/P EST LOW 20 MIN: CPT | Mod: 25 | Performed by: INTERNAL MEDICINE

## 2025-05-01 PROCEDURE — 3079F DIAST BP 80-89 MM HG: CPT | Performed by: INTERNAL MEDICINE

## 2025-05-01 PROCEDURE — 1036F TOBACCO NON-USER: CPT | Performed by: INTERNAL MEDICINE

## 2025-05-01 RX ORDER — METFORMIN HYDROCHLORIDE 500 MG/1
500 TABLET, EXTENDED RELEASE ORAL
COMMUNITY
Start: 2025-03-12

## 2025-05-01 ASSESSMENT — ENCOUNTER SYMPTOMS
LOSS OF SENSATION IN FEET: 1
OCCASIONAL FEELINGS OF UNSTEADINESS: 1
DEPRESSION: 1

## 2025-05-01 NOTE — PROGRESS NOTES
"Counseling:  The patient was counseled regarding diagnostic results, instructions for management, risk factor reductions, prognosis, patient and family education, impressions, risks and benefits of treatment options and importance of compliance with treatment.      Chief Complaint:   The patient presents today for annual followup of CAD, HTN and dyslipidemia, and for cardiac clearance prior to hip replacement surgery.     History Of Present Illness:    Lizbeth Fulton is an 79 year old female patient who presents today for annual followup of CAD, HTN and dyslipidemia, and for cardiac clearance prior to hip replacement surgery. Her PMH is significant for mild CAD, HTN, GERD, h/o H. pylori infection, and hyperlipidemia. The patient is scheduled for hip replacement surgery on 05/27/2025. Over the past year, the patient states that she has done well from a cardiac standpoint. She denies any CP, chest discomfort or SOB. She reports 1 episode of indigestion that initially worried her, so she took some ASA just in case. The indigestion resolved within 15 minutes. BP has been stable. EKG today shows NSR with no acute changes. The patient is compliant with her prescribed medications, noting that she was recently started on metformin for management of prediabetes.     Last Recorded Vitals:  Vitals:    05/01/25 1147   BP: 122/88   BP Location: Left arm   Pulse: 80   Weight: 84.8 kg (187 lb)   Height: 1.6 m (5' 3\")       Past Surgical History:  She has a past surgical history that includes Other surgical history (02/24/2022); Other surgical history (02/24/2022); Other surgical history (02/24/2022); and Other surgical history (02/24/2022).      Social History:  She reports that she has never smoked. She has never used smokeless tobacco. She reports current alcohol use. She reports that she does not use drugs.    Family History:  No family history on file.     Allergies:  Adhesive tape-silicones, Morphine, and " Naproxen    Outpatient Medications:  Current Outpatient Medications   Medication Instructions    acetaminophen (TYLENOL) 500 mg, Every 8 hours PRN    amLODIPine (NORVASC) 5 mg, Daily    aspirin 81 mg, Daily RT    atorvastatin (LIPITOR) 40 mg, oral, Nightly    brimonidine-timoloL (Combigan) 0.2-0.5 % ophthalmic solution instill 1 drop into left eye twice a day    hydrocortisone (CORTEF) 10 mg, Daily RT    levothyroxine (SYNTHROID, LEVOXYL) 50 mcg, Daily RT    lisinopril 20 mg, oral, Daily    metFORMIN XR (GLUCOPHAGE-XR) 500 mg, 2 times daily (morning and late afternoon)    metoprolol succinate XL (TOPROL-XL) 50 mg, Daily    pantoprazole (PROTONIX) 40 mg, Daily RT     Review of Systems   All other systems reviewed and are negative.     Physical Exam:  Constitutional:       Appearance: Healthy appearance. Not in distress.   Neck:      Vascular: No JVR. JVD normal.   Pulmonary:      Effort: Pulmonary effort is normal.      Breath sounds: Normal breath sounds. No wheezing. No rhonchi. No rales.   Chest:      Chest wall: Not tender to palpatation.   Cardiovascular:      PMI at left midclavicular line. Normal rate. Regular rhythm. Normal S1. Normal S2.       Murmurs: There is no murmur.      No gallop.  No click. No rub.   Pulses:     Intact distal pulses.   Edema:     Peripheral edema absent.   Abdominal:      General: Bowel sounds are normal.      Palpations: Abdomen is soft.      Tenderness: There is no abdominal tenderness.   Musculoskeletal: Normal range of motion.         General: No tenderness. Skin:     General: Skin is warm and dry.   Neurological:      General: No focal deficit present.      Mental Status: Alert and oriented to person, place and time.          Last Labs:  CBC -  Lab Results   Component Value Date    WBC 7.1 04/19/2022    HGB 14.3 04/19/2022    HCT 43.1 04/19/2022    MCV 92 04/19/2022     04/19/2022       CMP -  Lab Results   Component Value Date    CALCIUM 9.2 04/12/2024       RENAL  FUNCTION PANEL -   Lab Results   Component Value Date    GLUCOSE 88 04/12/2024     04/12/2024    K 4.4 04/12/2024     04/12/2024    CO2 28 04/12/2024    ANIONGAP 10 04/12/2024    BUN 20 04/12/2024    CREATININE 1.55 (H) 04/12/2024    CALCIUM 9.2 04/12/2024        Lab Results   Component Value Date    HGBA1C 6.2 (H) 05/26/2021       Last Cardiology Tests:  03/10/2022 - Vascular Lab Renal Artery U/S   1. Right Renal Artery: Right renal arteries demonstrate no evidence of hemodynamically significant stenosis. The right renal vein is patent with pulsatile flow.  2. Left Renal Artery: Left renal arteries demonstrate no evidence of hemodynamically significant stenosis. The left renal vein is patent with pulsatile flow. Single renal cyst documented in the left kidney.     03/10/2022 - TTE  1. The left ventricular systolic function is normal with a 60-65% estimated ejection fraction.  2. There is moderate tricuspid regurgitation.     02/08/2022 - Cardiac Catheterization (LH)  No evidence of significant coronary artery disease.     Lab review: I have personally reviewed the laboratory result(s).    Assessment/Plan   1) HTN / Leg Swelling  On lisinopril 20 mg daily, metoprolol succinate 50 mg daily, amlodipine 5 mg daily  Renal artery duplex March 2022 negative for SARA bilaterally  TTE March 2022 with normal LVEF  BP stable  Continue current medical Rx   F/U 1 year      2) Mild CAD   On ASA 81 mg daily, lisinopril 20 mg daily, metoprolol succinate 50 mg daily, amlodipine 5 mg daily, atorvastatin 40 mg daily.  Initially presented with chest pain suggestive of typical angina  [facility] Feb 2022 with mild CAD  Denies CP, chest discomfort or SOB  Reports 1 episode of indigestion - took ASA just in case, symptoms resolved within 15 minutes  On Protonix  BP stable   Scheduled for hip replacement surgery with Dr. DAYANNA Krause 05/27/2025  Low risk for planned surgery - clearance sent to Dr. Krause  Continue current medical Rx    F/U 1 year      3) Hyperlipidemia  Goal LDL <70  On atorvastatin 40 mg daily at bedtime   Simvastatin previously switched to atorvastatin s/t persistently elevated LDL  Lipid panel 04/12/2024 with LDL of 83  Continue current medical Rx   F/U 1 year     4) Pituitary Adenoma   S/P recent 03/2024  Steadily recovering, with improvement in vision    5) Prediabetes   Recently prescribed metformin       Scribe Attestation  By signing my name below, I, Andra Quevedo   attest that this documentation has been prepared under the direction and in the presence of Isaías Mccray MD.

## 2025-05-01 NOTE — LETTER
May 1, 2025     Jhoan Marks MD  [address] [facility] [address]  [address]  [facility] [address]    Patient: Lizbeth Fulton   YOB: 1945   Date of Visit: 5/1/2025       Dear Dr. Jhoan Marks MD:    Thank you for referring Lizbeth Fulton to me for evaluation. Below are my notes for this consultation.  If you have questions, please do not hesitate to call me. I look forward to following your patient along with you.       Sincerely,     Isaías Mccray MD      CC: Brad Krause MD  ______________________________________________________________________________________    Counseling:  The patient was counseled regarding diagnostic results, instructions for management, risk factor reductions, prognosis, patient and family education, impressions, risks and benefits of treatment options and importance of compliance with treatment.      Chief Complaint:   The patient presents today for annual followup of CAD, HTN and dyslipidemia, and for cardiac clearance prior to hip replacement surgery.     History Of Present Illness:    Lizbeth Fulton is an 79 year old female patient who presents today for annual followup of CAD, HTN and dyslipidemia, and for cardiac clearance prior to hip replacement surgery. Her PMH is significant for mild CAD, HTN, GERD, h/o H. pylori infection, and hyperlipidemia. The patient is scheduled for hip replacement surgery on 05/27/2025. Over the past year, the patient states that she has done well from a cardiac standpoint. She denies any CP, chest discomfort or SOB. She reports 1 episode of indigestion that initially worried her, so she took some ASA just in case. The indigestion resolved within 15 minutes. BP has been stable. EKG today shows NSR with no acute changes. The patient is compliant with her prescribed medications, noting that she was recently started on metformin for management of prediabetes.     Last Recorded Vitals:  Vitals:    05/01/25 1147   BP: 122/88   BP Location: Left arm  "  Pulse: 80   Weight: 84.8 kg (187 lb)   Height: 1.6 m (5' 3\")       Past Surgical History:  She has a past surgical history that includes Other surgical history (02/24/2022); Other surgical history (02/24/2022); Other surgical history (02/24/2022); and Other surgical history (02/24/2022).      Social History:  She reports that she has never smoked. She has never used smokeless tobacco. She reports current alcohol use. She reports that she does not use drugs.    Family History:  No family history on file.     Allergies:  Adhesive tape-silicones, Morphine, and Naproxen    Outpatient Medications:  Current Outpatient Medications   Medication Instructions   • acetaminophen (TYLENOL) 500 mg, Every 8 hours PRN   • amLODIPine (NORVASC) 5 mg, Daily   • aspirin 81 mg, Daily RT   • atorvastatin (LIPITOR) 40 mg, oral, Nightly   • brimonidine-timoloL (Combigan) 0.2-0.5 % ophthalmic solution instill 1 drop into left eye twice a day   • hydrocortisone (CORTEF) 10 mg, Daily RT   • levothyroxine (SYNTHROID, LEVOXYL) 50 mcg, Daily RT   • lisinopril 20 mg, oral, Daily   • metFORMIN XR (GLUCOPHAGE-XR) 500 mg, 2 times daily (morning and late afternoon)   • metoprolol succinate XL (TOPROL-XL) 50 mg, Daily   • pantoprazole (PROTONIX) 40 mg, Daily RT     Review of Systems   All other systems reviewed and are negative.     Physical Exam:  Constitutional:       Appearance: Healthy appearance. Not in distress.   Neck:      Vascular: No JVR. JVD normal.   Pulmonary:      Effort: Pulmonary effort is normal.      Breath sounds: Normal breath sounds. No wheezing. No rhonchi. No rales.   Chest:      Chest wall: Not tender to palpatation.   Cardiovascular:      PMI at left midclavicular line. Normal rate. Regular rhythm. Normal S1. Normal S2.       Murmurs: There is no murmur.      No gallop.  No click. No rub.   Pulses:     Intact distal pulses.   Edema:     Peripheral edema absent.   Abdominal:      General: Bowel sounds are normal.      " Palpations: Abdomen is soft.      Tenderness: There is no abdominal tenderness.   Musculoskeletal: Normal range of motion.         General: No tenderness. Skin:     General: Skin is warm and dry.   Neurological:      General: No focal deficit present.      Mental Status: Alert and oriented to person, place and time.          Last Labs:  CBC -  Lab Results   Component Value Date    WBC 7.1 04/19/2022    HGB 14.3 04/19/2022    HCT 43.1 04/19/2022    MCV 92 04/19/2022     04/19/2022       CMP -  Lab Results   Component Value Date    CALCIUM 9.2 04/12/2024       RENAL FUNCTION PANEL -   Lab Results   Component Value Date    GLUCOSE 88 04/12/2024     04/12/2024    K 4.4 04/12/2024     04/12/2024    CO2 28 04/12/2024    ANIONGAP 10 04/12/2024    BUN 20 04/12/2024    CREATININE 1.55 (H) 04/12/2024    CALCIUM 9.2 04/12/2024        Lab Results   Component Value Date    HGBA1C 6.2 (H) 05/26/2021       Last Cardiology Tests:  03/10/2022 - Vascular Lab Renal Artery U/S   1. Right Renal Artery: Right renal arteries demonstrate no evidence of hemodynamically significant stenosis. The right renal vein is patent with pulsatile flow.  2. Left Renal Artery: Left renal arteries demonstrate no evidence of hemodynamically significant stenosis. The left renal vein is patent with pulsatile flow. Single renal cyst documented in the left kidney.     03/10/2022 - TTE  1. The left ventricular systolic function is normal with a 60-65% estimated ejection fraction.  2. There is moderate tricuspid regurgitation.     02/08/2022 - Cardiac Catheterization (LH)  No evidence of significant coronary artery disease.     Lab review: I have personally reviewed the laboratory result(s).    Assessment/Plan  1) HTN / Leg Swelling  On lisinopril 20 mg daily, metoprolol succinate 50 mg daily, amlodipine 5 mg daily  Renal artery duplex March 2022 negative for SARA bilaterally  TTE March 2022 with normal LVEF  BP stable  Continue current  medical Rx   F/U 1 year      2) Mild CAD   On ASA 81 mg daily, lisinopril 20 mg daily, metoprolol succinate 50 mg daily, amlodipine 5 mg daily, atorvastatin 40 mg daily.  Initially presented with chest pain suggestive of typical angina  [facility] 2022 with mild CAD  Denies CP, chest discomfort or SOB  Reports 1 episode of indigestion - took ASA just in case, symptoms resolved within 15 minutes  On Protonix  BP stable   Scheduled for hip replacement surgery with Dr. DAYANNA Krause 2025  Low risk for planned surgery - clearance sent to Dr. Krause  Continue current medical Rx   F/U 1 year      3) Hyperlipidemia  Goal LDL <70  On atorvastatin 40 mg daily at bedtime   Simvastatin previously switched to atorvastatin s/t persistently elevated LDL  Lipid panel 2024 with LDL of 83  Continue current medical Rx   F/U 1 year     4) Pituitary Adenoma   S/P recent 2024  Steadily recovering, with improvement in vision    5) Prediabetes   Recently prescribed metformin       Scribe Attestation  By signing my name below, I, Oracio Quevedoibe   attest that this documentation has been prepared under the direction and in the presence of Isaías Mccray MD.                          [address] [address]                   Phone# [phone]              Fax# [phone]      Date: 25    RE: Lizbeth Fulton            : 1945       Surgical/Procedural Clearance for:  Hip replacement  Patient is at: LOW cardiovascular risk for this INTERMEDIATE risk procedure.           N/A hold Antiplatelet                 N/A hold Anticoagulant                 Is further cardiac workup is needed prior to the procedure?  No     Patient should continue Beta Blocker in the perioperative period.  Yes     Patient should resume antiplatelet/anticoagulation as soon as cleared by surgeon/procedure physician.  N/A       Thank You,    25 at 12:06 PM - Isaías Mccray MD

## 2025-05-01 NOTE — PATIENT INSTRUCTIONS
Continue all current medications as prescribed.  From a heart standpoint, you are cleared for your upcoming surgery. A clearance letter has been sent to Dr. Krause.   Followup with Dr. Mccray in 1 year, sooner should any issues or concerns arise before then.     If you have any questions or cardiac concerns, please call our office at [phone].

## 2025-05-16 ENCOUNTER — HOSPITAL ENCOUNTER (OUTPATIENT)
Age: 80
Discharge: HOME | End: 2025-05-18

## 2025-05-16 LAB
ABO + RH BLD: NORMAL
ARM BAND NUMBER: NORMAL
BLOOD BANK SAMPLE EXPIRATION: NORMAL
BLOOD GROUP ANTIBODIES SERPL: NEGATIVE

## 2025-05-16 PROCEDURE — 86850 RBC ANTIBODY SCREEN: CPT

## 2025-05-16 PROCEDURE — 86900 BLOOD TYPING SEROLOGIC ABO: CPT

## 2025-05-16 PROCEDURE — 87081 CULTURE SCREEN ONLY: CPT

## 2025-05-16 PROCEDURE — 86901 BLOOD TYPING SEROLOGIC RH(D): CPT

## 2025-05-18 DIAGNOSIS — I10 HYPERTENSION, UNSPECIFIED TYPE: ICD-10-CM

## 2025-05-18 LAB
MICROORGANISM SPEC CULT: NORMAL
SPECIMEN DESCRIPTION: NORMAL

## 2025-05-28 ENCOUNTER — HOSPITAL ENCOUNTER (OUTPATIENT)
Age: 80
Discharge: HOME | End: 2025-05-30

## 2025-05-28 LAB
ANION GAP SERPL CALCULATED.3IONS-SCNC: 15 MMOL/L (ref 7–16)
BUN SERPL-MCNC: 16 MG/DL (ref 6–23)
CALCIUM SERPL-MCNC: 7.9 MG/DL (ref 8.6–10.2)
CHLORIDE SERPL-SCNC: 105 MMOL/L (ref 98–107)
CO2 SERPL-SCNC: 20 MMOL/L (ref 22–29)
CREAT SERPL-MCNC: 1 MG/DL (ref 0.5–1)
ERYTHROCYTE [DISTWIDTH] IN BLOOD BY AUTOMATED COUNT: 14.4 % (ref 11.5–15)
GFR, ESTIMATED: 57 ML/MIN/1.73M2
GLUCOSE SERPL-MCNC: 119 MG/DL (ref 74–99)
HCT VFR BLD AUTO: 37.2 % (ref 34–48)
HGB BLD-MCNC: 11.9 G/DL (ref 11.5–15.5)
MCH RBC QN AUTO: 29.2 PG (ref 26–35)
MCHC RBC AUTO-ENTMCNC: 32 G/DL (ref 32–34.5)
MCV RBC AUTO: 91.2 FL (ref 80–99.9)
PLATELET # BLD AUTO: 195 K/UL (ref 130–450)
PMV BLD AUTO: 11 FL (ref 7–12)
POTASSIUM SERPL-SCNC: 4.5 MMOL/L (ref 3.5–5)
RBC # BLD AUTO: 4.08 M/UL (ref 3.5–5.5)
SODIUM SERPL-SCNC: 140 MMOL/L (ref 132–146)
WBC OTHER # BLD: 12.3 K/UL (ref 4.5–11.5)

## 2025-05-28 PROCEDURE — 80048 BASIC METABOLIC PNL TOTAL CA: CPT

## 2025-05-28 PROCEDURE — 85027 COMPLETE CBC AUTOMATED: CPT

## 2025-06-04 RX ORDER — LISINOPRIL 20 MG/1
20 TABLET ORAL DAILY
Qty: 90 TABLET | Refills: 3 | Status: SHIPPED | OUTPATIENT
Start: 2025-06-04 | End: 2026-06-04

## 2025-06-04 RX ORDER — LISINOPRIL 20 MG/1
20 TABLET ORAL DAILY
Qty: 30 TABLET | Refills: 0 | Status: SHIPPED | OUTPATIENT
Start: 2025-06-04 | End: 2025-06-04 | Stop reason: SDUPTHER

## 2025-08-18 DIAGNOSIS — E78.5 HYPERLIPIDEMIA, UNSPECIFIED HYPERLIPIDEMIA TYPE: ICD-10-CM

## 2025-08-19 RX ORDER — ATORVASTATIN CALCIUM 40 MG/1
40 TABLET, FILM COATED ORAL NIGHTLY
Qty: 90 TABLET | Refills: 3 | Status: SHIPPED | OUTPATIENT
Start: 2025-08-19